# Patient Record
Sex: MALE | NOT HISPANIC OR LATINO | Employment: OTHER | URBAN - METROPOLITAN AREA
[De-identification: names, ages, dates, MRNs, and addresses within clinical notes are randomized per-mention and may not be internally consistent; named-entity substitution may affect disease eponyms.]

---

## 2019-01-10 ENCOUNTER — TELEPHONE (OUTPATIENT)
Dept: FAMILY MEDICINE CLINIC | Facility: CLINIC | Age: 49
End: 2019-01-10

## 2019-01-10 NOTE — TELEPHONE ENCOUNTER
----- Message from Lynnette Sanchez sent at 1/10/2019 12:27 PM EST -----  Contact: BROTHER  HIS BROTHER BULL CALLED @ 12:28 TO SEE IF THERE IS ANYWAY THEY CAN COME IN LATER (AROUND 2:30) TOMORROW (OR AROUND 10). THEY HAVE PRAYER 1 - 2. PLEASE LET THEM KNOW.

## 2019-01-10 NOTE — TELEPHONE ENCOUNTER
I don't really understand this message at all I don't feel to have enough information.  I guess you're saying that this patient scheduled an appointment at a time that he cannot make he wants to see if he can come in at another time?  It seems this would just be a simple answer of put him in a different slot that we have open.  ----- Message from Lynnette Sanchez sent at 1/10/2019 12:27 PM EST -----  Contact: BROTHER  HIS BROTHER BULL CALLED @ 12:28 TO SEE IF THERE IS ANYWAY THEY CAN COME IN LATER (AROUND 2:30) TOMORROW (OR AROUND 10). THEY HAVE PRAYER 1 - 2. PLEASE LET THEM KNOW.

## 2019-01-11 ENCOUNTER — OFFICE VISIT (OUTPATIENT)
Dept: FAMILY MEDICINE CLINIC | Facility: CLINIC | Age: 49
End: 2019-01-11

## 2019-01-11 VITALS
HEART RATE: 83 BPM | HEIGHT: 70 IN | BODY MASS INDEX: 23.91 KG/M2 | DIASTOLIC BLOOD PRESSURE: 88 MMHG | OXYGEN SATURATION: 99 % | SYSTOLIC BLOOD PRESSURE: 136 MMHG | WEIGHT: 167 LBS | TEMPERATURE: 97.9 F

## 2019-01-11 DIAGNOSIS — J34.2 NASAL SEPTAL DEVIATION: ICD-10-CM

## 2019-01-11 DIAGNOSIS — J33.9 NASAL POLYPS: ICD-10-CM

## 2019-01-11 DIAGNOSIS — E11.9 DIABETES MELLITUS TYPE 2 IN NONOBESE (HCC): Primary | ICD-10-CM

## 2019-01-11 DIAGNOSIS — N52.1 ERECTILE DYSFUNCTION DUE TO DISEASES CLASSIFIED ELSEWHERE: ICD-10-CM

## 2019-01-11 DIAGNOSIS — E78.5 HYPERLIPIDEMIA, UNSPECIFIED HYPERLIPIDEMIA TYPE: ICD-10-CM

## 2019-01-11 PROCEDURE — 99203 OFFICE O/P NEW LOW 30 MIN: CPT | Performed by: PHYSICIAN ASSISTANT

## 2019-01-11 RX ORDER — TADALAFIL 5 MG/1
TABLET ORAL
Qty: 30 TABLET | Refills: 11 | Status: SHIPPED | OUTPATIENT
Start: 2019-01-11

## 2019-01-11 RX ORDER — CHLORAL HYDRATE 500 MG
CAPSULE ORAL
COMMUNITY

## 2019-01-11 RX ORDER — GLYBURIDE-METFORMIN HYDROCHLORIDE 1.25; 25 MG/1; MG/1
1 TABLET ORAL
COMMUNITY
End: 2019-01-15 | Stop reason: ALTCHOICE

## 2019-01-11 RX ORDER — TADALAFIL 20 MG/1
20 TABLET ORAL DAILY PRN
Qty: 6 TABLET | Refills: 5 | Status: SHIPPED | OUTPATIENT
Start: 2019-01-11 | End: 2019-08-22 | Stop reason: SDUPTHER

## 2019-01-11 NOTE — PROGRESS NOTES
Asher Oconnor is a 48 y.o. male  Establish Care (new patient establish care, previous pcp [facility] ); Diabetes (follow up on diabetes, concerned about reading, req referral ); and Nasal Polyps (ongoing nasal polyps, whistling in nose x1 month )      History of Present Illness  Patient comes in today as a new patient to our practice to establish care, his brother comes here as a patient.  He is moving here from [place].  He has type 2 diabetes mellitus which she has had for 4 years and has been well-controlled his last A1c was 7.5 he states that is up from where he has been around 6.5.  He is interested in seeing an endocrinologist and is due for some screening studies.  He also has a history of hyperlipidemia and needs a refill on his medication he has not had it filled in the [place] yet.  The following portions of the patient's history were reviewed and updated as appropriate: allergies, current medications, past social history and problem list    Review of Systems   Constitutional: Negative for appetite change, diaphoresis, fatigue and unexpected weight change.   HENT: Positive for congestion ( Chronic nasal congestion interested in seeing an ENT).    Eyes: Negative for visual disturbance.   Respiratory: Negative for chest tightness and shortness of breath.    Cardiovascular: Negative for chest pain, palpitations and leg swelling.   Gastrointestinal: Negative for diarrhea, nausea and vomiting.   Endocrine: Negative for polydipsia, polyphagia and polyuria.   Genitourinary:        Erectile dysfunction   Musculoskeletal: Negative.    Skin: Negative for color change.   Neurological: Negative for dizziness, weakness, light-headedness and numbness.   Psychiatric/Behavioral: Negative.        Objective     Vitals:    01/11/19 1347   BP: 136/88   Pulse: 83   Temp: 97.9 °F (36.6 °C)   SpO2: 99%       Physical Exam   Constitutional: He appears well-developed and well-nourished. No distress.   HENT:   Head:  Normocephalic and atraumatic.   Nose: Septal deviation present.   Eyes: Conjunctivae are normal.   Neck: No JVD present.   Cardiovascular: Normal rate, regular rhythm, normal heart sounds and intact distal pulses.   No murmur heard.  Pulses:       Dorsalis pedis pulses are 2+ on the right side, and 2+ on the left side.        Posterior tibial pulses are 2+ on the right side, and 2+ on the left side.   Pulmonary/Chest: Effort normal and breath sounds normal. No respiratory distress. He exhibits no tenderness.   Abdominal: Soft. He exhibits no distension. There is no tenderness.   Musculoskeletal: He exhibits no edema.   Neurological: Coordination normal.   Skin: Skin is warm and dry. No rash noted. He is not diaphoretic. No erythema. No pallor.   Psychiatric: He has a normal mood and affect.   Nursing note and vitals reviewed.      Assessment/Plan     Diagnoses and all orders for this visit:    Diabetes mellitus type 2 in nonobese (CMS/[facility])  -     Ambulatory Referral to Nutrition Services  -     Ambulatory Referral to Endocrinology  -     Ambulatory Referral to Ophthalmology    Hyperlipidemia, unspecified hyperlipidemia type    Erectile dysfunction due to diseases classified elsewhere    Nasal septal deviation  -     Ambulatory Referral to ENT (Otolaryngology)    Nasal polyps  -     Ambulatory Referral to ENT (Otolaryngology)    Other orders  -     sitaGLIPtin-metFORMIN (JANUMET)  MG per tablet; Take 1 tablet by mouth 2 (Two) Times a Day With Meals.  -     sitaGLIPtin-metFORMIN (JANUMET)  MG per tablet; Take 1 tablet by mouth 2 (Two) Times a Day With Meals.  -     Omega-3 Fatty Acids (FISH OIL) 1000 MG capsule capsule; Take  by mouth Daily With Breakfast.  -     ASPIRIN ADULT LOW DOSE PO; Take  by mouth.  -     glyBURIDE-metFORMIN (GLUCOVANCE) 1. MG per tablet; Take 1 tablet by mouth Daily With Breakfast.  -     tadalafil (CIALIS) 5 MG tablet; Take one daily for erectile dysfunction associated with  diabetes mellitus  -     tadalafil (CIALIS) 20 MG tablet; Take 1 tablet by mouth Daily As Needed for erectile dysfunction.

## 2019-01-14 ENCOUNTER — TELEPHONE (OUTPATIENT)
Dept: FAMILY MEDICINE CLINIC | Facility: CLINIC | Age: 49
End: 2019-01-14

## 2019-01-14 NOTE — TELEPHONE ENCOUNTER
----- Message from Jodie Mota sent at 1/14/2019  8:23 AM EST -----  Regarding: WANTING TO BE WORKED IN (SEEN ON FRIDAY AS A NP)  Contact: [phone]  PT'S. BROTHER:  BULL ALCARAZ, IS WANTING [facility]. TO BE WORKED IN FOR A PHY. THIS AM.  PT'S. BROTHER WAS TOLD ON Friday, THAT WOULD HAVE TO CHECK BACK ON Monday, TO SEE, IF ANY AVAILABILITY.  BROTHER WAS ADAMANT THAT HE BE PHONED BACK @ [facility].

## 2019-01-14 NOTE — TELEPHONE ENCOUNTER
I'm not sure about the miscommunication but I certainly am not working him in for a physical today.  I believe when he left I actually said schedule a physical to come back in a couple of weeks.  Please call to find a more details.  Also want to make sure exactly what medication he has been taking so that we can make sure we have his diabetic medication correct.  I asked him to go to the pharmacist and discussed this with the pharmacist to see what equivalents we have in the [place] and we have not heard back.

## 2019-01-14 NOTE — TELEPHONE ENCOUNTER
I spoke to the pharmacist about the patients prescription of Jazeeta ( arabic) Cholesterol medication. She could not find any notes or info on this medication. She believes that this is atorvastatin and zetia combined. She also has questions on the Janumet precription- she says she asked the patient multiple times if he was on both and he said yes. She wants to know if this correct or needs to be changed because insurance will not pay for both. Please advise.

## 2019-01-14 NOTE — TELEPHONE ENCOUNTER
Notified patient/ brother that he needs to schedule an appt for an physical. Also notified patient and brother that he needs to call and find out the americ[place] equivalent to his medications. He states he will call back this afternoon with info.

## 2019-01-15 ENCOUNTER — TELEPHONE (OUTPATIENT)
Dept: FAMILY MEDICINE CLINIC | Facility: CLINIC | Age: 49
End: 2019-01-15

## 2019-01-15 RX ORDER — ATORVASTATIN CALCIUM 20 MG/1
20 TABLET, FILM COATED ORAL DAILY
Qty: 30 TABLET | Refills: 2 | Status: SHIPPED | OUTPATIENT
Start: 2019-01-15

## 2019-01-15 NOTE — TELEPHONE ENCOUNTER
Please notify pharmacy and patient that I have sent in the medication that I will be prescribing for him for his diabetes until he sees his endocrinologist at which time they can change it if needed.  I have also sent in the cholesterol medicine but I will be prescribing for him and I would like a referral to cardiology for him for evaluation of hyperlipidemia as he has a family history of heart disease with hyperlipidemia.  Please explained to the patient that these are the medications that we will be using at this time although they may be different from what he has taken in the past.

## 2019-01-15 NOTE — TELEPHONE ENCOUNTER
Patient has different strengths on file, Adenike is aware of all the phone calls and messages and is working on this

## 2019-01-15 NOTE — TELEPHONE ENCOUNTER
----- Message from Jodie Mota sent at 1/15/2019  8:09 AM EST -----  Contact: BROTHER:  BULL ALCARAZ  PT. IS NEEDING REFILLS ON: sitaGLIPtin-metFORMIN (JANUMET)  MG per tablet       Sig - Route: Take 1 tablet by mouth 2 (Two) Times a Day With Meals. - Oral     (DIABETES MEDICATION).    RX=[id number] [facility] - [place], [place] - [address]   - [phone] PH - [phone] (Phone)  [phone] (Fax)    PT. LEAVES FOR FLIGHT IN 1 HR. (8:13AM CALLED).    PT. CAN BE REACHED @ [facility].  PT'S. BROTHER STATED [facility] FAXED THIS REQUEST OVER ON Friday & HE PHONED OFFICE ON Friday, AFTER VISIT WITH RAFAEL.

## 2019-03-13 RX ORDER — FLASH GLUCOSE SENSOR
1 KIT MISCELLANEOUS DAILY
Qty: 1 EACH | Refills: 1 | Status: SHIPPED | OUTPATIENT
Start: 2019-03-13

## 2019-03-13 NOTE — TELEPHONE ENCOUNTER
This is not on the patients med list, I spoke to him and he states he bought this out of pocket and needs the strips for it. He says this is a continuous sensor that checks his blood sugar throughout the day.

## 2019-03-13 NOTE — TELEPHONE ENCOUNTER
----- Message from Toya Mendez sent at 3/13/2019  8:52 AM EDT -----  Contact: PTS BROTHER, BULL ALCARAZ [phone]  REQUESTING RX FOR FREESTYLE ISAIAS STRIPS, STATES HE IS LEAVING IN AN HOUR    [facility] [address], [phone], FAX # [phone]

## 2019-08-22 RX ORDER — TADALAFIL 20 MG/1
20 TABLET ORAL DAILY PRN
Qty: 6 TABLET | Refills: 5 | Status: SHIPPED | OUTPATIENT
Start: 2019-08-22 | End: 2019-08-22 | Stop reason: SDUPTHER

## 2019-08-22 RX ORDER — TADALAFIL 20 MG/1
20 TABLET ORAL DAILY PRN
Qty: 6 TABLET | Refills: 5 | Status: SHIPPED | OUTPATIENT
Start: 2019-08-22

## 2020-12-28 RX ORDER — TADALAFIL 20 MG/1
20 TABLET ORAL DAILY PRN
Qty: 6 TABLET | Refills: 5 | Status: CANCELLED | OUTPATIENT
Start: 2020-12-28